# Patient Record
Sex: MALE | Race: BLACK OR AFRICAN AMERICAN | NOT HISPANIC OR LATINO | URBAN - METROPOLITAN AREA
[De-identification: names, ages, dates, MRNs, and addresses within clinical notes are randomized per-mention and may not be internally consistent; named-entity substitution may affect disease eponyms.]

---

## 2023-05-05 ENCOUNTER — EMERGENCY (EMERGENCY)
Facility: HOSPITAL | Age: 58
LOS: 1 days | Discharge: HOME | End: 2023-05-05
Attending: EMERGENCY MEDICINE
Payer: COMMERCIAL

## 2023-05-05 VITALS
OXYGEN SATURATION: 98 % | HEART RATE: 76 BPM | DIASTOLIC BLOOD PRESSURE: 99 MMHG | RESPIRATION RATE: 18 BRPM | TEMPERATURE: 99 F | HEIGHT: 75 IN | WEIGHT: 214.95 LBS | SYSTOLIC BLOOD PRESSURE: 160 MMHG

## 2023-05-05 VITALS
SYSTOLIC BLOOD PRESSURE: 126 MMHG | RESPIRATION RATE: 18 BRPM | TEMPERATURE: 98 F | DIASTOLIC BLOOD PRESSURE: 91 MMHG | HEART RATE: 63 BPM | OXYGEN SATURATION: 99 %

## 2023-05-05 PROCEDURE — 96374 THER/PROPH/DIAG INJ IV PUSH: CPT | Mod: XU

## 2023-05-05 PROCEDURE — 73564 X-RAY EXAM KNEE 4 OR MORE: CPT

## 2023-05-05 PROCEDURE — 90715 TDAP VACCINE 7 YRS/> IM: CPT

## 2023-05-05 PROCEDURE — 12034 INTMD RPR S/TR/EXT 7.6-12.5: CPT

## 2023-05-05 PROCEDURE — 73564 X-RAY EXAM KNEE 4 OR MORE: CPT | Mod: 26,LT

## 2023-05-05 PROCEDURE — 73552 X-RAY EXAM OF FEMUR 2/>: CPT | Mod: 26,LT

## 2023-05-05 PROCEDURE — 99284 EMERGENCY DEPT VISIT MOD MDM: CPT | Mod: 25

## 2023-05-05 PROCEDURE — 90471 IMMUNIZATION ADMIN: CPT

## 2023-05-05 PROCEDURE — 73552 X-RAY EXAM OF FEMUR 2/>: CPT

## 2023-05-05 RX ORDER — CEFAZOLIN SODIUM 1 G
1000 VIAL (EA) INJECTION ONCE
Refills: 0 | Status: COMPLETED | OUTPATIENT
Start: 2023-05-05 | End: 2023-05-05

## 2023-05-05 RX ORDER — TETANUS TOXOID, REDUCED DIPHTHERIA TOXOID AND ACELLULAR PERTUSSIS VACCINE, ADSORBED 5; 2.5; 8; 8; 2.5 [IU]/.5ML; [IU]/.5ML; UG/.5ML; UG/.5ML; UG/.5ML
0.5 SUSPENSION INTRAMUSCULAR ONCE
Refills: 0 | Status: COMPLETED | OUTPATIENT
Start: 2023-05-05 | End: 2023-05-05

## 2023-05-05 RX ORDER — ACETAMINOPHEN 500 MG
975 TABLET ORAL ONCE
Refills: 0 | Status: COMPLETED | OUTPATIENT
Start: 2023-05-05 | End: 2023-05-05

## 2023-05-05 RX ORDER — CEPHALEXIN 500 MG
1 CAPSULE ORAL
Qty: 40 | Refills: 0
Start: 2023-05-05 | End: 2023-05-14

## 2023-05-05 RX ADMIN — TETANUS TOXOID, REDUCED DIPHTHERIA TOXOID AND ACELLULAR PERTUSSIS VACCINE, ADSORBED 0.5 MILLILITER(S): 5; 2.5; 8; 8; 2.5 SUSPENSION INTRAMUSCULAR at 17:34

## 2023-05-05 RX ADMIN — Medication 100 MILLIGRAM(S): at 18:00

## 2023-05-05 RX ADMIN — Medication 975 MILLIGRAM(S): at 17:33

## 2023-05-05 NOTE — ED PROCEDURE NOTE - PROCEDURE ADDITIONAL DETAILS
Dr. Sewell Note: multiple blood clots delivered from the wound prior to closure. Compartment soft. NV intact pre and post procedure.

## 2023-05-05 NOTE — ED PROVIDER NOTE - NSFOLLOWUPINSTRUCTIONS_ED_ALL_ED_FT
1. Keep the wound clean and dry for 24 hours then gently rinse the wound daily without scrubbing.  Apply neosporin or bacitracin 1-2 times daily.  Change the dressing daily.    2. Please come back Monday (5/8/23) for wound check    3. Return to the ED with new or worsening symptoms including fever, pus, redness, uncontrolled pain, color change of leg, numbness of leg, weakness of leg, or any other worsening symptoms.    4. Please apply light compression wrap and elevate lower extremity.     5. Return in 10-14 days for suture removal.    6. Take antibiotic as prescribed (sent to pharmacy).
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
## 2023-05-05 NOTE — ED PROVIDER NOTE - PATIENT PORTAL LINK FT
You can access the FollowMyHealth Patient Portal offered by [facility] by registering at the following website: [email or web address]. By joining [employer]’s FollowMyHealth portal, you will also be able to view your health information using other applications (apps) compatible with our system.

## 2023-05-05 NOTE — ED PROVIDER NOTE - PHYSICAL EXAMINATION
PHYSICAL EXAM:  GENERAL: non-toxic appearing; in no respiratory distress  HEENT: Atraumatic, Normocephalic, PERRL, EOMs intact b/l w/out deficits, no conjunctival pallor, MMM  NECK: No JVD; FROM  CHEST/LUNG: CTAB no wheezes/rhonchi/rales  HEART: RRR no murmur/gallops/rubs  ABDOMEN: +BS, soft, NT, ND  EXTREMITIES: LLE w/ mild edema above the knee, +2 radial pulses b/l, +2 DP/PT pulses b/l  MUSCULOSKELETAL: FROM of all 4 extremities  NERVOUS SYSTEM:  A&Ox3, No motor deficits or sensory deficits; CNII-XII intact; no focal neurologic deficits  SKIN:  No new rashes; +large v-shaped laceration to LLE thigh above the knee. Oozing blood. No FB visualized. No bony visualized. Seemingly down to subq. No purulent drainage.
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
## 2023-05-05 NOTE — ED PROCEDURE NOTE - ATTENDING CONTRIBUTION TO CARE
as dictated in ED Provider Note.   Will have pt return for wound check as patient at risk for infection, less so for compartment syndrome.

## 2023-05-05 NOTE — ED PROVIDER NOTE - ATTENDING CONTRIBUTION TO CARE
Hx: acute laceration to LEFT lower thigh region against metal, accidental, prior to arrival, venous bleeding, no reported shooting arterial bleed, no change in motor or sensation reported.    PE: well appearing, nontoxic, +DP and PT pulse, very slight decreased sensation LEFT inner lower leg, laceration stellate anterior lower thigh with venous blood oozing, laceration deep to muscle layer, motor intact, 5/5 motor with dorsi/plantar-flexion.    MDM: left leg complex laceration, r/o foreign body, repair laceration, empiric antibiotics, update tdap vaccination

## 2023-05-05 NOTE — ED PROVIDER NOTE - NS ED ROS FT
Gen: Denies fever, weight loss  HEENT: Denies vision changes, ear pain, epistaxis, sore throat  CV: Denies chest pain, palpitations  Skin: Denies rash, erythema, color changes  Resp: Denies SOB, cough  Endo: Denies sensitivity to heat, cold, increased urination  GI: Denies abdominal pain, constipation, nausea, vomiting, diarrhea  Msk: Denies back pain, LE swelling; +extremity pain  : Denies dysuria, increased frequency  Neuro: Denies LOC, weakness, numbness, tingling  Psych: Denies hx of psych, hallucinations  ROS statement: all other ROS negative except as per HPI
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
## 2024-03-26 NOTE — ED PROCEDURE NOTE - CPROC ED LACER REPAIR DETAIL1
Statement Selected The wound was explored to base in bloodless field./No foreign body/Multiple flaps were aligned.